# Patient Record
Sex: FEMALE
[De-identification: names, ages, dates, MRNs, and addresses within clinical notes are randomized per-mention and may not be internally consistent; named-entity substitution may affect disease eponyms.]

---

## 2019-05-02 PROBLEM — Z00.00 ENCOUNTER FOR PREVENTIVE HEALTH EXAMINATION: Status: ACTIVE | Noted: 2019-05-02

## 2019-05-07 ENCOUNTER — APPOINTMENT (OUTPATIENT)
Dept: SURGERY | Facility: CLINIC | Age: 44
End: 2019-05-07

## 2019-07-11 ENCOUNTER — APPOINTMENT (OUTPATIENT)
Dept: SURGERY | Facility: CLINIC | Age: 44
End: 2019-07-11
Payer: COMMERCIAL

## 2019-07-11 VITALS
DIASTOLIC BLOOD PRESSURE: 68 MMHG | SYSTOLIC BLOOD PRESSURE: 104 MMHG | HEIGHT: 68 IN | TEMPERATURE: 98.3 F | WEIGHT: 135 LBS | OXYGEN SATURATION: 99 % | HEART RATE: 79 BPM | BODY MASS INDEX: 20.46 KG/M2

## 2019-07-11 DIAGNOSIS — Z78.9 OTHER SPECIFIED HEALTH STATUS: ICD-10-CM

## 2019-07-11 PROCEDURE — 99203 OFFICE O/P NEW LOW 30 MIN: CPT

## 2019-07-11 RX ORDER — FOLLITROPIN BETA,RECOMB 75 UNIT
VIAL (EA) INJECTION
Refills: 0 | Status: ACTIVE | COMMUNITY

## 2019-07-11 RX ORDER — MENOTROPINS 75 UNIT
KIT SUBCUTANEOUS
Refills: 0 | Status: ACTIVE | COMMUNITY

## 2019-07-11 NOTE — PHYSICAL EXAM
[Normal Rate and Rhythm] : normal rate and rhythm [Normal Breath Sounds] : Normal breath sounds [Alert] : alert [Oriented to Person] : oriented to person [Oriented to Place] : oriented to place [Oriented to Time] : oriented to time [Calm] : calm [JVD] : no jugular venous distention  [Abdominal Masses] : No abdominal masses [Abdomen Tenderness] : ~T ~M No abdominal tenderness [de-identified] : A/Ox3; NAD. appears irritable/anxious  [de-identified] : no LE edema, no erythema  [de-identified] : EOMI [de-identified] : has a small cyst, presently no infection noted, cyst <0.5 cm

## 2019-07-11 NOTE — PLAN
[FreeTextEntry1] : presently , no infection noted.\par Pt was very argumentative \par she does not want to have the cyst excised at this time.\par Recommended excision\par patient will return if she decides to have it removed.

## 2019-07-11 NOTE — HISTORY OF PRESENT ILLNESS
[de-identified] : 43 y.o F referred for consultation visit, she has a cyst on the back. Patient reports the cyst measured 3 cm in the past, and was infected. The cyst was \par drained. Presently, she does not have an infection.

## 2019-07-11 NOTE — REVIEW OF SYSTEMS
[Chills] : no chills [Fever] : no fever [Eyesight Problems] : no eyesight problems [Nosebleeds] : no nosebleeds [Chest Pain] : no chest pain [Shortness Of Breath] : no shortness of breath [Wheezing] : no wheezing [Cough] : no cough [Abdominal Pain] : no abdominal pain [Arthralgias] : no arthralgias [Dizziness] : no dizziness [Anxiety] : no anxiety [Muscle Weakness] : no muscle weakness [Swollen Glands] : no swollen glands [de-identified] : complains of having a cyst, upper back

## 2025-02-09 ENCOUNTER — NON-APPOINTMENT (OUTPATIENT)
Age: 50
End: 2025-02-09

## 2025-06-10 ENCOUNTER — NON-APPOINTMENT (OUTPATIENT)
Age: 50
End: 2025-06-10

## 2025-07-13 ENCOUNTER — NON-APPOINTMENT (OUTPATIENT)
Age: 50
End: 2025-07-13

## 2025-07-13 ENCOUNTER — INPATIENT (INPATIENT)
Facility: HOSPITAL | Age: 50
LOS: 2 days | Discharge: HOME | DRG: 694 | End: 2025-07-16
Attending: STUDENT IN AN ORGANIZED HEALTH CARE EDUCATION/TRAINING PROGRAM | Admitting: STUDENT IN AN ORGANIZED HEALTH CARE EDUCATION/TRAINING PROGRAM
Payer: COMMERCIAL

## 2025-07-13 VITALS
HEIGHT: 68 IN | HEART RATE: 55 BPM | DIASTOLIC BLOOD PRESSURE: 83 MMHG | WEIGHT: 151.02 LBS | RESPIRATION RATE: 16 BRPM | SYSTOLIC BLOOD PRESSURE: 125 MMHG | TEMPERATURE: 99 F | OXYGEN SATURATION: 99 %

## 2025-07-13 DIAGNOSIS — N23 UNSPECIFIED RENAL COLIC: ICD-10-CM

## 2025-07-13 LAB
ALBUMIN SERPL ELPH-MCNC: 4.4 G/DL — SIGNIFICANT CHANGE UP (ref 3.5–5)
ALP SERPL-CCNC: 73 U/L — SIGNIFICANT CHANGE UP (ref 40–120)
ALT FLD-CCNC: 27 U/L DA — SIGNIFICANT CHANGE UP (ref 10–60)
ANION GAP SERPL CALC-SCNC: 8 MMOL/L — SIGNIFICANT CHANGE UP (ref 5–17)
APPEARANCE UR: ABNORMAL
AST SERPL-CCNC: 20 U/L — SIGNIFICANT CHANGE UP (ref 10–40)
BACTERIA # UR AUTO: ABNORMAL /HPF
BASOPHILS # BLD AUTO: 0.01 K/UL — SIGNIFICANT CHANGE UP (ref 0–0.2)
BASOPHILS NFR BLD AUTO: 0.1 % — SIGNIFICANT CHANGE UP (ref 0–2)
BILIRUB SERPL-MCNC: 0.7 MG/DL — SIGNIFICANT CHANGE UP (ref 0.2–1.2)
BILIRUB UR-MCNC: ABNORMAL
BUN SERPL-MCNC: 15 MG/DL — SIGNIFICANT CHANGE UP (ref 7–18)
CALCIUM SERPL-MCNC: 9.7 MG/DL — SIGNIFICANT CHANGE UP (ref 8.4–10.5)
CHLORIDE SERPL-SCNC: 109 MMOL/L — HIGH (ref 96–108)
CO2 SERPL-SCNC: 23 MMOL/L — SIGNIFICANT CHANGE UP (ref 22–31)
COLOR SPEC: ABNORMAL
CREAT SERPL-MCNC: 1.02 MG/DL — SIGNIFICANT CHANGE UP (ref 0.5–1.3)
DIFF PNL FLD: ABNORMAL
EGFR: 67 ML/MIN/1.73M2 — SIGNIFICANT CHANGE UP
EGFR: 67 ML/MIN/1.73M2 — SIGNIFICANT CHANGE UP
EOSINOPHIL # BLD AUTO: 0 K/UL — SIGNIFICANT CHANGE UP (ref 0–0.5)
EOSINOPHIL NFR BLD AUTO: 0 % — SIGNIFICANT CHANGE UP (ref 0–6)
EPI CELLS # UR: PRESENT
GLUCOSE SERPL-MCNC: 133 MG/DL — HIGH (ref 70–99)
GLUCOSE UR QL: NEGATIVE MG/DL — SIGNIFICANT CHANGE UP
HCG SERPL-ACNC: <1 MIU/ML — SIGNIFICANT CHANGE UP
HCT VFR BLD CALC: 37.1 % — SIGNIFICANT CHANGE UP (ref 34.5–45)
HGB BLD-MCNC: 12.6 G/DL — SIGNIFICANT CHANGE UP (ref 11.5–15.5)
IMM GRANULOCYTES NFR BLD AUTO: 0.3 % — SIGNIFICANT CHANGE UP (ref 0–0.9)
KETONES UR QL: >=160 MG/DL
LACTATE SERPL-SCNC: 1.4 MMOL/L — SIGNIFICANT CHANGE UP (ref 0.7–2)
LEUKOCYTE ESTERASE UR-ACNC: ABNORMAL
LIDOCAIN IGE QN: 16 U/L — SIGNIFICANT CHANGE UP (ref 13–75)
LYMPHOCYTES # BLD AUTO: 0.61 K/UL — LOW (ref 1–3.3)
LYMPHOCYTES # BLD AUTO: 6 % — LOW (ref 13–44)
MCHC RBC-ENTMCNC: 31.8 PG — SIGNIFICANT CHANGE UP (ref 27–34)
MCHC RBC-ENTMCNC: 34 G/DL — SIGNIFICANT CHANGE UP (ref 32–36)
MCV RBC AUTO: 93.7 FL — SIGNIFICANT CHANGE UP (ref 80–100)
MONOCYTES # BLD AUTO: 0.24 K/UL — SIGNIFICANT CHANGE UP (ref 0–0.9)
MONOCYTES NFR BLD AUTO: 2.3 % — SIGNIFICANT CHANGE UP (ref 2–14)
NEUTROPHILS # BLD AUTO: 9.34 K/UL — HIGH (ref 1.8–7.4)
NEUTROPHILS NFR BLD AUTO: 91.3 % — HIGH (ref 43–77)
NITRITE UR-MCNC: NEGATIVE — SIGNIFICANT CHANGE UP
NRBC BLD AUTO-RTO: 0 /100 WBCS — SIGNIFICANT CHANGE UP (ref 0–0)
PH UR: 6.5 — SIGNIFICANT CHANGE UP (ref 5–8)
PLATELET # BLD AUTO: 219 K/UL — SIGNIFICANT CHANGE UP (ref 150–400)
POTASSIUM SERPL-MCNC: 3.9 MMOL/L — SIGNIFICANT CHANGE UP (ref 3.5–5.3)
POTASSIUM SERPL-SCNC: 3.9 MMOL/L — SIGNIFICANT CHANGE UP (ref 3.5–5.3)
PROT SERPL-MCNC: 7.9 G/DL — SIGNIFICANT CHANGE UP (ref 6–8.3)
PROT UR-MCNC: 100 MG/DL
RBC # BLD: 3.96 M/UL — SIGNIFICANT CHANGE UP (ref 3.8–5.2)
RBC # FLD: 12.3 % — SIGNIFICANT CHANGE UP (ref 10.3–14.5)
RBC CASTS # UR COMP ASSIST: ABNORMAL /HPF
SODIUM SERPL-SCNC: 140 MMOL/L — SIGNIFICANT CHANGE UP (ref 135–145)
SP GR SPEC: 1.03 — SIGNIFICANT CHANGE UP (ref 1–1.03)
UROBILINOGEN FLD QL: 1 MG/DL — SIGNIFICANT CHANGE UP (ref 0.2–1)
WBC # BLD: 10.23 K/UL — SIGNIFICANT CHANGE UP (ref 3.8–10.5)
WBC # FLD AUTO: 10.23 K/UL — SIGNIFICANT CHANGE UP (ref 3.8–10.5)
WBC UR QL: 5 /HPF — SIGNIFICANT CHANGE UP (ref 0–5)

## 2025-07-13 PROCEDURE — 81001 URINALYSIS AUTO W/SCOPE: CPT

## 2025-07-13 PROCEDURE — 85025 COMPLETE CBC W/AUTO DIFF WBC: CPT

## 2025-07-13 PROCEDURE — 83690 ASSAY OF LIPASE: CPT

## 2025-07-13 PROCEDURE — 74176 CT ABD & PELVIS W/O CONTRAST: CPT

## 2025-07-13 PROCEDURE — 80053 COMPREHEN METABOLIC PANEL: CPT

## 2025-07-13 PROCEDURE — 84702 CHORIONIC GONADOTROPIN TEST: CPT

## 2025-07-13 PROCEDURE — 74176 CT ABD & PELVIS W/O CONTRAST: CPT | Mod: 26

## 2025-07-13 PROCEDURE — 99285 EMERGENCY DEPT VISIT HI MDM: CPT

## 2025-07-13 PROCEDURE — 36415 COLL VENOUS BLD VENIPUNCTURE: CPT

## 2025-07-13 PROCEDURE — 99223 1ST HOSP IP/OBS HIGH 75: CPT | Mod: GC

## 2025-07-13 PROCEDURE — 83605 ASSAY OF LACTIC ACID: CPT

## 2025-07-13 RX ORDER — KETOROLAC TROMETHAMINE 30 MG/ML
15 INJECTION, SOLUTION INTRAMUSCULAR; INTRAVENOUS ONCE
Refills: 0 | Status: DISCONTINUED | OUTPATIENT
Start: 2025-07-13 | End: 2025-07-13

## 2025-07-13 RX ORDER — ONDANSETRON HCL/PF 4 MG/2 ML
4 VIAL (ML) INJECTION ONCE
Refills: 0 | Status: COMPLETED | OUTPATIENT
Start: 2025-07-13 | End: 2025-07-13

## 2025-07-13 RX ORDER — ACETAMINOPHEN 500 MG/5ML
650 LIQUID (ML) ORAL ONCE
Refills: 0 | Status: COMPLETED | OUTPATIENT
Start: 2025-07-13 | End: 2025-07-13

## 2025-07-13 RX ORDER — TAMSULOSIN HYDROCHLORIDE 0.4 MG/1
0.4 CAPSULE ORAL AT BEDTIME
Refills: 0 | Status: DISCONTINUED | OUTPATIENT
Start: 2025-07-13 | End: 2025-07-16

## 2025-07-13 RX ADMIN — Medication 650 MILLIGRAM(S): at 19:46

## 2025-07-13 RX ADMIN — Medication 4 MILLIGRAM(S): at 19:45

## 2025-07-13 RX ADMIN — Medication 2 MILLIGRAM(S): at 19:45

## 2025-07-13 RX ADMIN — KETOROLAC TROMETHAMINE 15 MILLIGRAM(S): 30 INJECTION, SOLUTION INTRAMUSCULAR; INTRAVENOUS at 20:16

## 2025-07-13 RX ADMIN — Medication 4 MILLIGRAM(S): at 20:58

## 2025-07-13 RX ADMIN — Medication 4 MILLIGRAM(S): at 20:59

## 2025-07-13 RX ADMIN — Medication 2 MILLIGRAM(S): at 20:15

## 2025-07-13 RX ADMIN — Medication 4 MILLIGRAM(S): at 21:28

## 2025-07-13 RX ADMIN — Medication 650 MILLIGRAM(S): at 20:16

## 2025-07-13 RX ADMIN — KETOROLAC TROMETHAMINE 15 MILLIGRAM(S): 30 INJECTION, SOLUTION INTRAMUSCULAR; INTRAVENOUS at 19:46

## 2025-07-13 RX ADMIN — Medication 1000 MILLILITER(S): at 20:45

## 2025-07-13 RX ADMIN — Medication 20 MILLIGRAM(S): at 19:45

## 2025-07-13 RX ADMIN — Medication 1000 MILLILITER(S): at 19:45

## 2025-07-13 NOTE — H&P ADULT - HISTORY OF PRESENT ILLNESS
50 y/o F w/ no significant PMHx presenting with left lower quadrant abdominal associated with hematuria, nausea and NBNB vomiting starting today morning around 11am. Pt initially went to urgent care for symptoms, however was recommended to go to the ED for concerning symptoms and further evaluation. Pt describes the pain as dull, rating it 7/10 along with chills and inability to keep anything down including water without vomiting. Pt denies any history of similar symptoms or hx of kidney stones. She attempted to take OTC Tylenol at home however unable to keep it down. Of note, pt drinks only about 2-3 glasses of water a day. Pt further denies fever, hx abdominal surgeries, hematochezia, dizziness, dysuria/urinary frequency, hememesis, diarrhea/constipation    In the ED, treated with IVF, antiemetics, pain control: received IVF 1000 bolus, zofran, acetaminophen, pepcid, ketorolac, and morphine one time dose with moderate temporary relief of pain, but still nauseous and vomiting, now intermittent dull abdominal pain. On Ceftriaxone 1000 mg to cover for possible pyelonephritis.     Labs to r/o electrolyte abnml and pancreatitis- no leukocytosis, no DONTRELL noted, lipase wnl, HCG NEG  Lactate- WNL 1.4  UA to r/o UTI- NEG for UTI, but large amount blood   CT renal to r/o nephrolithiasis/colitis: shows a left-sided 4 mm stone 50 y/o F w/ no significant PMHx presenting with left lower quadrant abdominal associated with hematuria, nausea and NBNB vomiting starting today morning around 11am. Pt initially went to urgent care for symptoms, however was recommended to go to the ED for concerning symptoms and further evaluation. Pt describes the pain as dull, initially rating it 7/10 along with chills and inability to keep anything down including water without vomiting. Now, pain is about 3/10 and able to intake few sips of water. Pt denies any history of similar symptoms or hx of kidney stones. She attempted to take OTC Tylenol at home however unable to keep it down. Of note, pt drinks only about 2-3 glasses of water a day. Pt further denies fever, hx abdominal surgeries, hematochezia, dizziness, dysuria/urinary frequency, hememesis, diarrhea/constipation    In the ED, treated with IVF, antiemetics, pain control: received IVF 1000 bolus, zofran, acetaminophen, pepcid, ketorolac, and morphine one time dose with moderate temporary relief of pain, but still nauseous and vomiting, now intermittent dull abdominal pain. On Ceftriaxone 1000 mg to cover for possible pyelonephritis.     Labs to r/o electrolyte abnml and pancreatitis- no leukocytosis, no DONTRELL noted, lipase wnl, HCG NEG  Lactate- WNL 1.4  UA to r/o UTI- NEG for UTI, but large amount blood   CT renal to r/o nephrolithiasis/colitis: shows a left-sided 4 mm stone 48 y/o F w/ no significant PMHx presenting with left lower quadrant abdominal pain associated with hematuria, nausea and NBNB vomiting starting today morning around 11am. Pt initially went to urgent care for symptoms, however was recommended to go to the ED for concerning symptoms and further evaluation. Pt describes the pain as dull, initially rating it 7/10 along with chills and inability to keep anything down including water without vomiting. Now, pain is about 3/10 and able to intake only few sips of water. Pt denies any history of similar symptoms or hx of kidney stones. She attempted to take OTC Tylenol at home however unable to keep it down. Of note, pt drinks only about 2-3 glasses of water a day. Pt further denies fever, hx abdominal surgeries, hematochezia, dizziness, dysuria/urinary frequency, hematemesis, diarrhea/constipation.   Last BM: yesterday and a small BM sunday morning    In the ED, treated with IVF, antiemetics, pain control: received IVF 1000 bolus, zofran, acetaminophen, pepcid, ketorolac, and morphine one time dose with moderate temporary relief of pain, but still nauseous and vomiting, now with intermittent dull abdominal pain. On Ceftriaxone 1000 mg to cover for possible pyelonephritis.     Labs to r/o electrolyte abnml and pancreatitis- no leukocytosis, no DONTRELL noted, lipase wnl, HCG NEG  Lactate- WNL 1.4  UA to r/o UTI- NEG for UTI, but large amount blood   CT renal to r/o nephrolithiasis/colitis: shows a left-sided 4 mm stone

## 2025-07-13 NOTE — H&P ADULT - NSHPPHYSICALEXAM_GEN_ALL_CORE
T(C): 36.7 (07-13-25 @ 23:57), Max: 37.1 (07-13-25 @ 18:34)  HR: 57 (07-13-25 @ 23:57) (55 - 57)  BP: 146/91 (07-13-25 @ 23:57) (125/83 - 146/91)  RR: 18 (07-13-25 @ 23:57) (16 - 18)  SpO2: 96% (07-13-25 @ 23:57) (96% - 99%)    CONSTITUTIONAL: Well groomed, no apparent distress, dressed in coat and knitted beanie  EYES: PERRLA and symmetric, EOMI, No conjunctival or scleral injection, non-icteric  ENMT: Mildly dry oral mucous membranes. Normal dentition; no pharyngeal injection or exudates  NECK: Supple, symmetric and without tracheal deviation   RESP: No respiratory distress, no use of accessory muscles; CTA b/l, no WRR  CV: RRR, +S1S2, no MRG; no JVD; no peripheral edema  GI: Soft, mild tenderness to palpation in the LLQ, no rebound, no guarding; no palpable masses; no hepatosplenomegaly; no CVA tenderness   LYMPH: No cervical LAD or tenderness; no axillary LAD or tenderness; no inguinal LAD or tenderness  MSK: Normal gait; No digital clubbing or cyanosis, Normal ROM without pain, normal muscle strength/tone  SKIN: No rashes or ulcers noted; no subcutaneous nodules or induration palpable  NEURO: CN II-XII intact; normal reflexes in upper and lower extremities, sensation intact in upper and lower extremities b/l to light touch

## 2025-07-13 NOTE — H&P ADULT - PROBLEM SELECTOR PLAN 2
- Zofran, pepcid for symptom control NBNB vomiting and nausea, unable to tolerate PO intake except for few sips of water.     - C/w Zofran, pepcid for symptom control  - IVF

## 2025-07-13 NOTE — ED ADULT NURSE NOTE - NSFALLUNIVINTERV_ED_ALL_ED
Bed/Stretcher in lowest position, wheels locked, appropriate side rails in place/Call bell, personal items and telephone in reach/Instruct patient to call for assistance before getting out of bed/chair/stretcher/Non-slip footwear applied when patient is off stretcher/[place] to call system/Physically safe environment - no spills, clutter or unnecessary equipment/Purposeful proactive rounding/Room/bathroom lighting operational, light cord in reach

## 2025-07-13 NOTE — H&P ADULT - ATTENDING COMMENTS
IMAGING  Non-Contrast CT A/P  IMPRESSION:  Mild left perinephric stranding and mild left hydronephrosis and hydroureter secondary to a 4 mm stone at the proximal left ureter.    HPI  49 year old female patient with no pmhx who presented to the ER due to left flank pain with nausea, vomiting, and hematuria.  The patient states her symptoms started at 11am today. She has been unable to tolerate oral intake as she vomits up anything she tries to eat including water. She went to Urgent Care where she was given Zofran sublingually. CT A/P with left 4mm kidney stone. Patient denies personal or family history of kidney stones. No fever, no chills, no dysuria.    Review Of Systems included: + nausea, + vomiting, + loss of appetite, + left sided abdominal/flank pain, + hematuria,   No fever, no chills, no dysuria, no diarrhea, no prior kidney stones, no family history of kidney stones    Patient preferred to speak in English when  offered  Physical Exam  General: Awake, Alert, Oriented  Cardiac: RRR  Pulmonary: CTA b/l  Abdominal: Soft, ND, +LLQ tenderness, no CVA tenderness b/l  Extremities: No edema b/l    A/P  # 4 mm Left Kidney Stone  # Nausea and Vomiting  # Severe Intractable Pain  > U/A negative for UTI  - Urology consulted  - IV Fluid Hydration  - Flomax 0.4mg qHS  - Tylenol prn for mild pain, IV Morphine 1mg prn for moderate pain, IV Morphine 2mg prn for severe pain  - Zofran prn  - Will hold off on antibiotics as no fever, no leukocytosis, and U/A is negative  - Strain urine for calculi    # DVT PPx  - Lovenox  IMPROVE Score: 0 pts    # FEN  - Regular Diet  - Monitor and replete electrolytes as needed  - IV Fluid Hydration    Previous Admissions Included  7/13/2025: Urgent Care Visit for hematuria, lower abdominal pain  6/10/2025: Urgent Care Visit for URI  2/9/2025: Urgent Care Visit for Influenza, Sore Throat  7/11/2019: Surgery Clinic Visit: cyst on back. presently, no infection noted. she does not want to have the cyst excised at this time.    Patient case and management was discussed with ER Attending  I did examine all labs (including CBC, CMP, Lactate, Lipase, HCG, UA), imaging, prior notes    Time-based billing (NON-critical care).  76 minutes spent on total encounter excluding any time spent teaching residents. The necessity of the time spent during the encounter on this date of service was due to: Review of records, vital signs, labs, microbiology, and imaging, Ordering labs and/or tests, General physical examination performed, Generation of treatment plan, Counseling of the patient and/or family members

## 2025-07-13 NOTE — H&P ADULT - NSHPREVIEWOFSYSTEMS_GEN_ALL_CORE
REVIEW OF SYSTEMS:    CONSTITUTIONAL: No weakness, fevers (+) chills  EYES/ENT: No visual changes;  No vertigo or throat pain   NECK: No pain or stiffness  RESPIRATORY: No cough, wheezing, hemoptysis; No shortness of breath  CARDIOVASCULAR: No chest pain or palpitations  GASTROINTESTINAL: (+) abdominal pain. (+) nausea, vomiting, or NO hematemesis; No diarrhea or constipation. No melena or hematochezia.  GENITOURINARY: No dysuria, frequency (+) hematuria  NEUROLOGICAL: No numbness or weakness  SKIN: No itching, burning, rashes, or lesions REVIEW OF SYSTEMS:    CONSTITUTIONAL: No weakness, fevers (+) chills  EYES/ENT: No visual changes;  No vertigo or throat pain   NECK: No pain or stiffness  RESPIRATORY: No cough, wheezing, hemoptysis; No shortness of breath  CARDIOVASCULAR: No chest pain or palpitations  GASTROINTESTINAL: (+) abdominal pain. (+) nausea, vomiting,  NO hematemesis; No diarrhea or constipation. No melena or hematochezia.  GENITOURINARY: No dysuria, frequency (+) hematuria  NEUROLOGICAL: No numbness or weakness  SKIN: No itching, burning, rashes, or lesions

## 2025-07-13 NOTE — ED PROVIDER NOTE - PHYSICAL EXAMINATION
Gen: no acute distress. uncomfortable appearing   Head: normocephalic, atraumatic  Lung: CTAB, no respiratory distress, no wheezing, rales, rhonchi  CV: normal s1/s2, rrr,   Abd: soft,  mildly tender to palpation in the left lower quadrant, left CVA tenderness, no rebound no guarding no peritoneal signs  MSK: full range of motion in all 4 extremities  Neuro: No focal neurologic deficits

## 2025-07-13 NOTE — ED ADULT NURSE NOTE - ED STAT RN HANDOFF DETAILS
Patient transferred to main ED and was advised for admission. Report given to rosalie hines for continuity of plan of care.

## 2025-07-13 NOTE — ED PROVIDER NOTE - OBJECTIVE STATEMENT
Patient is a 49-year-old female with no significant past medical history presenting  with left flank pain radiating to the groin as well as hematuria since earlier this morning.  As per patient and sister at bedside who is translating for patient as per patient request, patient has had dull intermittent left flank pain radiating to the left groin since yesterday as well as hematuria.  Denies any past medical history, allergies.  Denies any daily alcohol use, smoking, IV drug use.  Denies any fever, cough, congestion, dysuria, diarrhea.  Patient endorsing some nausea.

## 2025-07-13 NOTE — ED PROVIDER NOTE - ATTENDING APP SHARED VISIT CONTRIBUTION OF CARE
I have personally performed a history and physical exam on this patient and personally directed the management of the patient.    Patient is a 49-year-old female with no significant past medical history presenting  with left flank pain radiating to the groin as well as hematuria since earlier this morning.  As per patient and sister at bedside who is translating for patient as per patient request, patient has had dull intermittent left flank pain radiating to the left groin since yesterday as well as hematuria.  Denies any past medical history, allergies.  Denies any daily alcohol use, smoking, IV drug use.  Denies any fever, cough, congestion, dysuria, diarrhea.  Patient endorsing some nausea.

## 2025-07-13 NOTE — H&P ADULT - ASSESSMENT
50 y/o F w/ no significant PMHx presenting with left lower quadrant/flank pain associated with hematuria, nausea and vomiting. Found to have 4mm kidney stone on CT. Admitted to medicine for intractable nausea, vomiting and intermittent fla    oxycodone 5mg PO ?? pain is not that bad.  50 y/o F w/ no significant PMHx presenting with left lower quadrant/flank pain associated with hematuria, nausea and vomiting. Found to have 4mm kidney stone on CT; large amount of blood in UA, labs WNL, no leukocytosis. Admitted to medicine for intractable nausea, vomiting and inability to tolerate much PO intake other than few sips of water at a time.     50 y/o F w/ no significant PMHx presenting with left lower quadrant/flank pain associated with hematuria, nausea and vomiting. Found to have 4mm kidney stone on CT; large amount of blood in UA, labs WNL, no leukocytosis. Admitted to medicine for intractable nausea, vomiting and inability to tolerate PO intake other than few sips of water at a time. Started on Tamsulosin, IVF, zofran, pepcid and pain control with morphine and Tylenol.

## 2025-07-13 NOTE — ED PROVIDER NOTE - CLINICAL SUMMARY MEDICAL DECISION MAKING FREE TEXT BOX
Patient is a 49-year-old female with no significant past medical history presenting  with left flank pain radiating to the groin as well as hematuria since earlier this morning.  As per patient and sister at bedside who is translating for patient as per patient request, patient has had dull intermittent left flank pain radiating to the left groin since yesterday as well as hematuria.  VSS, uncomfortable appearing, belly soft,  mildly tender to palpation in the left lower quadrant, left CVA tenderness, no rebound no guarding no peritoneal signs.  -  will treat pain, antiemetics, check labs rule out electrolyte abnormalities and pancreatitis, lactate to eval for end organ dysfunction, UA to rule out urinary tract infection, CT renal stone to rule out nephrolithiasis versus colitis, reassess. Patient is a 49-year-old female with no significant past medical history presenting  with left flank pain radiating to the groin as well as hematuria since earlier this morning.  As per patient and sister at bedside who is translating for patient as per patient request, patient has had dull intermittent left flank pain radiating to the left groin since yesterday as well as hematuria.  VSS, uncomfortable appearing, belly soft,  mildly tender to palpation in the left lower quadrant, left CVA tenderness, no rebound no guarding no peritoneal signs.  -  will treat pain, antiemetics, check labs rule out electrolyte abnormalities and pancreatitis, lactate to eval for end organ dysfunction, UA to rule out urinary tract infection, CT renal stone to rule out nephrolithiasis versus colitis, reassess.    22: 56–signout from Dr. Jung pending CT results.  CT shows a left-sided 4 mm stone.  no leukocytosis.  No signs of urinary infection.  No DONTRELL.  However despite all the medication patient still vomiting and pain uncontrolled.  Discussed with urology for consult.  Plan to admit to medicine for symptomatic relief.  Discussed with Dr. Chamberlain.

## 2025-07-13 NOTE — H&P ADULT - PROBLEM SELECTOR PLAN 1
Found to have 4mm kidney stone on CT on the LT proximal ureter    -C/w Tamsulosin   - Pain control with morphine and Acetominophen - LLQ abdominal pain with hematuria. Found to have 4mm kidney stone on CT on the LT proximal ureter with large amounts of blood in UA     -C/w Tamsulosin   - C/w IVF  - Pain control with morphine for severe and moderate pain and Acetominophen  - D/c Ceftriaxone no evidence of pyelonephritis/superimposed UTI- no leukocytosis, or bacteria/WBC in urine

## 2025-07-13 NOTE — ED ADULT NURSE NOTE - OBJECTIVE STATEMENT
Patient presented to ED with left flank pain and hematuria started today. Denies any fever or dysuria.

## 2025-07-14 ENCOUNTER — TRANSCRIPTION ENCOUNTER (OUTPATIENT)
Age: 50
End: 2025-07-14

## 2025-07-14 DIAGNOSIS — Z29.9 ENCOUNTER FOR PROPHYLACTIC MEASURES, UNSPECIFIED: ICD-10-CM

## 2025-07-14 DIAGNOSIS — R11.2 NAUSEA WITH VOMITING, UNSPECIFIED: ICD-10-CM

## 2025-07-14 DIAGNOSIS — N20.0 CALCULUS OF KIDNEY: ICD-10-CM

## 2025-07-14 LAB
ANION GAP SERPL CALC-SCNC: 7 MMOL/L — SIGNIFICANT CHANGE UP (ref 5–17)
BUN SERPL-MCNC: 17 MG/DL — SIGNIFICANT CHANGE UP (ref 7–18)
CALCIUM SERPL-MCNC: 9 MG/DL — SIGNIFICANT CHANGE UP (ref 8.4–10.5)
CHLORIDE SERPL-SCNC: 108 MMOL/L — SIGNIFICANT CHANGE UP (ref 96–108)
CO2 SERPL-SCNC: 24 MMOL/L — SIGNIFICANT CHANGE UP (ref 22–31)
CREAT SERPL-MCNC: 0.92 MG/DL — SIGNIFICANT CHANGE UP (ref 0.5–1.3)
EGFR: 76 ML/MIN/1.73M2 — SIGNIFICANT CHANGE UP
EGFR: 76 ML/MIN/1.73M2 — SIGNIFICANT CHANGE UP
GLUCOSE SERPL-MCNC: 123 MG/DL — HIGH (ref 70–99)
HCT VFR BLD CALC: 34.4 % — LOW (ref 34.5–45)
HGB BLD-MCNC: 11.6 G/DL — SIGNIFICANT CHANGE UP (ref 11.5–15.5)
MAGNESIUM SERPL-MCNC: 2.2 MG/DL — SIGNIFICANT CHANGE UP (ref 1.6–2.6)
MCHC RBC-ENTMCNC: 32 PG — SIGNIFICANT CHANGE UP (ref 27–34)
MCHC RBC-ENTMCNC: 33.7 G/DL — SIGNIFICANT CHANGE UP (ref 32–36)
MCV RBC AUTO: 95 FL — SIGNIFICANT CHANGE UP (ref 80–100)
NRBC BLD AUTO-RTO: 0 /100 WBCS — SIGNIFICANT CHANGE UP (ref 0–0)
PHOSPHATE SERPL-MCNC: 3.8 MG/DL — SIGNIFICANT CHANGE UP (ref 2.5–4.5)
PLATELET # BLD AUTO: 178 K/UL — SIGNIFICANT CHANGE UP (ref 150–400)
POTASSIUM SERPL-MCNC: 3.7 MMOL/L — SIGNIFICANT CHANGE UP (ref 3.5–5.3)
POTASSIUM SERPL-SCNC: 3.7 MMOL/L — SIGNIFICANT CHANGE UP (ref 3.5–5.3)
RBC # BLD: 3.62 M/UL — LOW (ref 3.8–5.2)
RBC # FLD: 12.5 % — SIGNIFICANT CHANGE UP (ref 10.3–14.5)
SODIUM SERPL-SCNC: 139 MMOL/L — SIGNIFICANT CHANGE UP (ref 135–145)
WBC # BLD: 10.34 K/UL — SIGNIFICANT CHANGE UP (ref 3.8–10.5)
WBC # FLD AUTO: 10.34 K/UL — SIGNIFICANT CHANGE UP (ref 3.8–10.5)

## 2025-07-14 PROCEDURE — 85027 COMPLETE CBC AUTOMATED: CPT

## 2025-07-14 PROCEDURE — 84702 CHORIONIC GONADOTROPIN TEST: CPT

## 2025-07-14 PROCEDURE — 80048 BASIC METABOLIC PNL TOTAL CA: CPT

## 2025-07-14 PROCEDURE — 87640 STAPH A DNA AMP PROBE: CPT

## 2025-07-14 PROCEDURE — 80053 COMPREHEN METABOLIC PANEL: CPT

## 2025-07-14 PROCEDURE — 83690 ASSAY OF LIPASE: CPT

## 2025-07-14 PROCEDURE — 74176 CT ABD & PELVIS W/O CONTRAST: CPT

## 2025-07-14 PROCEDURE — 36415 COLL VENOUS BLD VENIPUNCTURE: CPT

## 2025-07-14 PROCEDURE — 84100 ASSAY OF PHOSPHORUS: CPT

## 2025-07-14 PROCEDURE — 83605 ASSAY OF LACTIC ACID: CPT

## 2025-07-14 PROCEDURE — 81001 URINALYSIS AUTO W/SCOPE: CPT

## 2025-07-14 PROCEDURE — 99232 SBSQ HOSP IP/OBS MODERATE 35: CPT

## 2025-07-14 PROCEDURE — 99223 1ST HOSP IP/OBS HIGH 75: CPT

## 2025-07-14 PROCEDURE — 87086 URINE CULTURE/COLONY COUNT: CPT

## 2025-07-14 PROCEDURE — 85025 COMPLETE CBC W/AUTO DIFF WBC: CPT

## 2025-07-14 PROCEDURE — 87641 MR-STAPH DNA AMP PROBE: CPT

## 2025-07-14 PROCEDURE — 83735 ASSAY OF MAGNESIUM: CPT

## 2025-07-14 RX ORDER — OXYCODONE HYDROCHLORIDE 30 MG/1
1 TABLET ORAL
Qty: 12 | Refills: 0
Start: 2025-07-14 | End: 2025-07-16

## 2025-07-14 RX ORDER — TAMSULOSIN HYDROCHLORIDE 0.4 MG/1
1 CAPSULE ORAL
Qty: 30 | Refills: 0
Start: 2025-07-14 | End: 2025-08-12

## 2025-07-14 RX ORDER — SODIUM CHLORIDE 9 G/1000ML
1000 INJECTION, SOLUTION INTRAVENOUS
Refills: 0 | Status: DISCONTINUED | OUTPATIENT
Start: 2025-07-14 | End: 2025-07-15

## 2025-07-14 RX ORDER — ACETAMINOPHEN 500 MG/5ML
975 LIQUID (ML) ORAL EVERY 8 HOURS
Refills: 0 | Status: DISCONTINUED | OUTPATIENT
Start: 2025-07-14 | End: 2025-07-14

## 2025-07-14 RX ORDER — IBUPROFEN 200 MG
600 TABLET ORAL EVERY 8 HOURS
Refills: 0 | Status: DISCONTINUED | OUTPATIENT
Start: 2025-07-14 | End: 2025-07-15

## 2025-07-14 RX ORDER — CEFTRIAXONE 500 MG/1
1000 INJECTION, POWDER, FOR SOLUTION INTRAMUSCULAR; INTRAVENOUS EVERY 24 HOURS
Refills: 0 | Status: DISCONTINUED | OUTPATIENT
Start: 2025-07-13 | End: 2025-07-14

## 2025-07-14 RX ORDER — ACETAMINOPHEN 500 MG/5ML
2 LIQUID (ML) ORAL
Qty: 50 | Refills: 0
Start: 2025-07-14 | End: 2025-08-12

## 2025-07-14 RX ORDER — OXYCODONE HYDROCHLORIDE 30 MG/1
2.5 TABLET ORAL EVERY 4 HOURS
Refills: 0 | Status: DISCONTINUED | OUTPATIENT
Start: 2025-07-14 | End: 2025-07-15

## 2025-07-14 RX ORDER — OXYCODONE HYDROCHLORIDE AND ACETAMINOPHEN 10; 325 MG/1; MG/1
1 TABLET ORAL EVERY 4 HOURS
Refills: 0 | Status: DISCONTINUED | OUTPATIENT
Start: 2025-07-14 | End: 2025-07-14

## 2025-07-14 RX ORDER — SODIUM CHLORIDE 9 G/1000ML
1000 INJECTION, SOLUTION INTRAVENOUS
Refills: 0 | Status: DISCONTINUED | OUTPATIENT
Start: 2025-07-13 | End: 2025-07-14

## 2025-07-14 RX ORDER — ENOXAPARIN SODIUM 100 MG/ML
40 INJECTION SUBCUTANEOUS EVERY 24 HOURS
Refills: 0 | Status: DISCONTINUED | OUTPATIENT
Start: 2025-07-14 | End: 2025-07-14

## 2025-07-14 RX ORDER — ACETAMINOPHEN 500 MG/5ML
2 LIQUID (ML) ORAL
Qty: 40 | Refills: 0
Start: 2025-07-14 | End: 2025-08-12

## 2025-07-14 RX ORDER — ONDANSETRON HCL/PF 4 MG/2 ML
4 VIAL (ML) INJECTION EVERY 8 HOURS
Refills: 0 | Status: DISCONTINUED | OUTPATIENT
Start: 2025-07-14 | End: 2025-07-16

## 2025-07-14 RX ORDER — ACETAMINOPHEN 500 MG/5ML
650 LIQUID (ML) ORAL EVERY 6 HOURS
Refills: 0 | Status: DISCONTINUED | OUTPATIENT
Start: 2025-07-14 | End: 2025-07-14

## 2025-07-14 RX ADMIN — Medication 2 MILLIGRAM(S): at 17:56

## 2025-07-14 RX ADMIN — CEFTRIAXONE 100 MILLIGRAM(S): 500 INJECTION, POWDER, FOR SOLUTION INTRAMUSCULAR; INTRAVENOUS at 00:38

## 2025-07-14 RX ADMIN — TAMSULOSIN HYDROCHLORIDE 0.4 MILLIGRAM(S): 0.4 CAPSULE ORAL at 21:10

## 2025-07-14 RX ADMIN — TAMSULOSIN HYDROCHLORIDE 0.4 MILLIGRAM(S): 0.4 CAPSULE ORAL at 00:38

## 2025-07-14 RX ADMIN — SODIUM CHLORIDE 75 MILLILITER(S): 9 INJECTION, SOLUTION INTRAVENOUS at 18:06

## 2025-07-14 RX ADMIN — SODIUM CHLORIDE 120 MILLILITER(S): 9 INJECTION, SOLUTION INTRAVENOUS at 00:57

## 2025-07-14 RX ADMIN — Medication 2 MILLIGRAM(S): at 18:25

## 2025-07-14 NOTE — DISCHARGE NOTE PROVIDER - NSDCMRMEDTOKEN_GEN_ALL_CORE_FT
acetaminophen 325 mg oral tablet: 2 tab(s) orally every 6 hours as needed for  mild pain  tamsulosin 0.4 mg oral capsule: 1 cap(s) orally once a day (at bedtime)   acetaminophen 325 mg oral tablet: 2 tab(s) orally every 6 hours as needed for  mild pain  oxyCODONE 5 mg oral tablet: 1 tab(s) orally every 6 hours as needed for  severe pain MDD: 4 tabs  tamsulosin 0.4 mg oral capsule: 1 cap(s) orally once a day (at bedtime)   acetaminophen 325 mg oral tablet: 2 tab(s) orally every 6 hours as needed for  mild pain  Cipro 250 mg oral tablet: 1 tab(s) orally 2 times a day  ketorolac 10 mg oral tablet: 1 tab(s) orally every 6 hours as needed for  severe pain  tamsulosin 0.4 mg oral capsule: 1 cap(s) orally once a day (at bedtime)

## 2025-07-14 NOTE — PROGRESS NOTE ADULT - ASSESSMENT
50 y/o F w/ no significant PMHx presenting with left lower quadrant abdominal pain associated with hematuria, nausea and NBNB vomiting for 1 day. CT in ED shows a 4mm L ureteral stone, admitted for pain control and PO intolerance.     # 4mm ureteral stone  # Intractable nausea and vomiting  -  recs appreciated, conservtive management recmmended  - continue IVF, morphine PRN, zofran PRN  - continue flomax  - monitor PO intake  - DVT ppx: ambulate ad kristyn

## 2025-07-14 NOTE — DISCHARGE NOTE PROVIDER - NSFOLLOWUPCLINICS_GEN_ALL_ED_FT
[facility]  Urology  [address] [place] [address]  Phone: [phone]  Fax: [phone]

## 2025-07-14 NOTE — CONSULT NOTE ADULT - ASSESSMENT
49 year old female with 4mm left proximal ureteral calculi    discussed with Dr Rooney  recommend MET- IV fluids, flomax,  multimodal pain control  patient does not require urological intervention at this time would recommend conservative management  thank you for the courtesy of this consult  if pain remains intractable and patient unable to tolerate PO recommend medical admission.   patient understands plan and expressed agreement.  49 year old female with 4mm left proximal ureteral calculi    discussed with Dr Rooney  recommend MET- IV fluids, flomax,  multimodal pain control.   patient does not require urological intervention at this time would recommend conservative management  thank you for the courtesy of this consult  if pain remains intractable and patient unable to tolerate PO recommend medical admission.   patient understands plan and expressed agreement.

## 2025-07-14 NOTE — DISCHARGE NOTE NURSING/CASE MANAGEMENT/SOCIAL WORK - NSDCFUADDAPPT_GEN_ALL_CORE_FT
APPTS ARE READY TO BE MADE: [X] YES    Best Family or Patient Contact (if needed):    Additional Information about above appointments (if needed):    1: PCP  2: urology  3:     Other comments or requests:

## 2025-07-14 NOTE — CONSULT NOTE ADULT - CONSULT REQUESTED BY NAME
Cyrus [facility].     Results     XRAY LUMBAR @ [facility] (
Review of Systems   Constitutional: Negative.    HENT: Negative.     Eyes: Negative.    Respiratory: Negative.     Cardiovascular: Negative.    Gastrointestinal: Negative.    Genitourinary: Negative.    Musculoskeletal:  Positive for back pain, falls and myalgias.   Skin: Negative.    Neurological:  Positive for tingling and weakness.   Endo/Heme/Allergies: Negative.    Psychiatric/Behavioral: Negative.        
SCOTT Del Rosario

## 2025-07-14 NOTE — CONSULT NOTE ADULT - SUBJECTIVE AND OBJECTIVE BOX
· Patient is a 49-year-old female with no significant past medical history presenting  with left flank pain radiating to the groin as well as hematuria since earlier this morning.  Mandarin  used and relayed patient has had dull intermittent left flank pain radiating to the left groin since yesterday as well as hematuria. States she had IVF but no other PMHx. CT shows 4mm stone at proximal left ureter with associated mild hydronephrosis.  Denies any daily alcohol use, smoking, IV drug use.  Denies any fever, cough, congestion, dysuria, diarrhea.  Patient endorsing some nausea.  Intepreter [id number]  LMP 1 week ago.     PAST MEDICAL & SURGICAL HISTORY:  IVF    Vital Signs Last 24 Hrs  T(C): 36.7 (2025 23:57), Max: 37.1 (2025 18:34)  T(F): 98 (2025 23:57), Max: 98.7 (2025 18:34)  HR: 57 (2025 23:57) (55 - 57)  BP: 146/91 (2025 23:57) (125/83 - 146/91)  BP(mean): --  RR: 18 (2025 23:57) (16 - 18)  SpO2: 96% (2025 23:57) (96% - 99%)    Parameters below as of 2025 23:57  Patient On (Oxygen Delivery Method): room air        Constitutional: awake and alert.  HEENT: PERRLA, EOMI  Neck: Supple.  Respiratory: Breath sounds are clear bilaterally  Cardiovascular: S1 and S2, regular   Gastrointestinal: soft, nontender to abdomen, + left CVAT  Extremities:  no edema  Musculoskeletal: no joint swelling/tenderness, no abnormal movements  Skin: No rashes    LABS:                        12.6   10.23 )-----------( 219      ( 2025 19:30 )             37.1     2025 19:30    140    |  109    |  15     ----------------------------<  133    3.9     |  23     |  1.02     Ca    9.7        2025 19:30    TPro  7.9    /  Alb  4.4    /  TBili  0.7    /  DBili  x      /  AST  20     /  ALT  27     /  AlkPhos  73     2025 19:30    Urinalysis Basic - ( 2025 19:45 )    Color: Orange / Appearance: Turbid / S.027 / pH: x  Gluc: x / Ketone: x  / Bili: Small / Urobili: 1.0 mg/dL   Blood: x / Protein: 100 mg/dL / Nitrite: Negative   Leuk Esterase: Small / RBC: Too Numerous to count mostly crenated /HPF / WBC 5 /HPF   Sq Epi: x / Non Sq Epi: x / Bacteria: Few /HPF    Lactate, Blood: 1.4 mmol/L (25 @ 19:30) < from: CT Renal Stone Hunt (25 @ 21:57) >  IMPRESSION:    Mild left perinephric stranding and mild left hydronephrosis and   hydroureter secondary to a 4 mm stone at the proximal left ureter.      < end of copied text >

## 2025-07-14 NOTE — DISCHARGE NOTE PROVIDER - PROVIDER TOKENS
PROVIDER:[TOKEN:[[id number]],FOLLOWUP:[1 week]],PROVIDER:[TOKEN:[[id number]],FOLLOWUP:[1 week]]

## 2025-07-14 NOTE — DISCHARGE NOTE PROVIDER - HOSPITAL COURSE
48 y/o F w/ no significant PMHx presenting with left lower quadrant abdominal pain associated with hematuria, nausea and NBNB vomiting starting today morning around 11am. Pt initially went to urgent care for symptoms, however was recommended to go to the ED for concerning symptoms and further evaluation. Pt describes the pain as dull, initially rating it 7/10 along with chills and inability to keep anything down including water without vomiting. Now, pain is about 3/10 and able to intake only few sips of water. Pt denies any history of similar symptoms or hx of kidney stones. She attempted to take OTC Tylenol at home however unable to keep it down. Of note, pt drinks only about 2-3 glasses of water a day. Pt further denies fever, hx abdominal surgeries, hematochezia, dizziness, dysuria/urinary frequency, hematemesis, diarrhea/constipation.   Last BM: yesterday and a small BM sunday morning    In the ED, treated with IVF, antiemetics, pain control: received IVF 1000 bolus, zofran, acetaminophen, pepcid, ketorolac, and morphine one time dose with moderate temporary relief of pain, but still nauseous and vomiting, now with intermittent dull abdominal pain. On Ceftriaxone 1000 mg to cover for possible pyelonephritis.     Labs to r/o electrolyte abnml and pancreatitis- no leukocytosis, no DONTRELL noted, lipase wnl, HCG NEG  Lactate- WNL 1.4  UA to r/o UTI- NEG for UTI, but large amount blood   CT renal to r/o nephrolithiasis/colitis: shows a left-sided 4 mm stone  Urology consulted, recommend IV fluids, flomax, multimodal pain control.   patient does not require urological intervention at this time would recommend conservative management.   patient is medically optimized for discharge home. 50 y/o F w/ no significant PMHx presenting with left lower quadrant abdominal pain associated with hematuria, nausea and NBNB vomiting starting today morning around 11am. Pt initially went to urgent care for symptoms, however was recommended to go to the ED for concerning symptoms and further evaluation. Pt describes the pain as dull, initially rating it 7/10 along with chills and inability to keep anything down including water without vomiting. Now, pain is about 3/10 and able to intake only few sips of water. Pt denies any history of similar symptoms or hx of kidney stones. She attempted to take OTC Tylenol at home however unable to keep it down. Of note, pt drinks only about 2-3 glasses of water a day. Pt further denies fever, hx abdominal surgeries, hematochezia, dizziness, dysuria/urinary frequency, hematemesis, diarrhea/constipation.   Last BM: yesterday and a small BM sunday morning    In the ED, treated with IVF, antiemetics, pain control: received IVF 1000 bolus, zofran, acetaminophen, pepcid, ketorolac, and morphine one time dose with moderate temporary relief of pain, but still nauseous and vomiting, now with intermittent dull abdominal pain.     Labs to r/o electrolyte abnml and pancreatitis- no leukocytosis, no DONTRELL noted, lipase wnl, HCG NEG  Lactate- WNL 1.4  UA to r/o UTI- NEG for UTI, but large amount blood, low clinical suspicion of UTI given lack of fever, leukocytosis, monitored off abx.  CT renal to r/o nephrolithiasis/colitis: shows a left-sided 4 mm stone  Urology consulted, recommend IV fluids, flomax, multimodal pain control.   patient does not require urological intervention at this time would recommend conservative management.   patient is medically optimized for discharge home.    48 y/o F w/ no significant PMHx presenting with left lower quadrant abdominal pain associated with hematuria, nausea and NBNB vomiting. Pt initially went to urgent care for symptoms, however was recommended to go to the ED for concerning symptoms and further evaluation. Pt describes the pain as dull, initially rating it 7/10 along with chills and inability to keep anything down including water without vomiting. In the ED, treated with IVF, antiemetics, pain control: received IVF 1000 bolus, zofran, acetaminophen, pepcid, ketorolac, and morphine one time dose with moderate temporary relief of pain, but still nauseous and vomiting, now with intermittent dull abdominal pain. Labs to r/o electrolyte abnml and pancreatitis- no leukocytosis, no DONTRELL noted, lipase wnl, HCG NEG. Lactate- WNL 1.4. UA to r/o UTI- NEG for UTI, but large amount blood, low clinical suspicion of UTI given lack of fever, leukocytosis, monitored off abx. CT renal to r/o nephrolithiasis/colitis: shows a left-sided 4 mm stone, Urology consulted.  Urine culture growing gram positive cocci started ceftriaxone, resulted enterococcus faecalis sensitive to ampicillin transitioned.  Patient passed stone, no pain NVD.     Patient seen and examined at bedside, discussed with medical attending. Patient medically cleared for discharge to home.

## 2025-07-14 NOTE — DISCHARGE NOTE NURSING/CASE MANAGEMENT/SOCIAL WORK - FINANCIAL ASSISTANCE
[facility] provides services at a reduced cost to those who are determined to be eligible through [facility]’s financial assistance program. Information regarding [facility]’s financial assistance program can be found by going to [email or web address] or by calling [phone].

## 2025-07-14 NOTE — DISCHARGE NOTE PROVIDER - CARE PROVIDERS DIRECT ADDRESSES
[email or web address]

## 2025-07-14 NOTE — DISCHARGE NOTE NURSING/CASE MANAGEMENT/SOCIAL WORK - PATIENT PORTAL LINK FT
You can access the FollowMyHealth Patient Portal offered by [facility] by registering at the following website: [email or web address]. By joining [employer]’s FollowMyHealth portal, you will also be able to view your health information using other applications (apps) compatible with our system.

## 2025-07-14 NOTE — CONSULT NOTE ADULT - NS ATTEND AMEND GEN_ALL_CORE FT
49-year-old female with 4 mm ureteral stone causing pain not controlled with initial IV pain management.  She should be started on tamsulosin and ensure urine culture sent.  Labs and vital signs not indicating infectious process so no urgent stenting needed.  Would recommend IV pain management however if patient pain not controlled with IV pain medications can consider taking the patient to the OR for ureteral stent insertion

## 2025-07-14 NOTE — PATIENT PROFILE ADULT - FALL HARM RISK - UNIVERSAL INTERVENTIONS
Bed in lowest position, wheels locked, appropriate side rails in place/Call bell, personal items and telephone in reach/Instruct patient to call for assistance before getting out of bed or chair/Non-slip footwear when patient is out of bed/[place] to call system/Physically safe environment - no spills, clutter or unnecessary equipment/Purposeful Proactive Rounding/Room/bathroom lighting operational, light cord in reach

## 2025-07-14 NOTE — DISCHARGE NOTE PROVIDER - NSDCCPCAREPLAN_GEN_ALL_CORE_FT
PRINCIPAL DISCHARGE DIAGNOSIS  Diagnosis: Renal stone  Assessment and Plan of Treatment: you came into the hospital due to left sided abdominal pain with blood in urine, nausea and vomiting.   you were found with a 4 mm left proximal ureteral calculi   when you urinate, please use the strainer to catch any stones.   put the stone in the cup and give to the Urologist Dr. Rooney.   continue taking flomax 0.4 mg at bedtime  take tylenol 650 mg every 6 hours as needed for mild pain   follow up with Dr. Rooney, make an appointment   also follow up with your Primary Care Doctor.  Return back to the Emergency room if you have any worsening fevers, abdominal pain, difficulty urinating, nausea, vomiting.     PRINCIPAL DISCHARGE DIAGNOSIS  Diagnosis: Renal stone  Assessment and Plan of Treatment: you came into the hospital due to left sided abdominal pain with blood in urine, nausea and vomiting.   you were found with a 4 mm left proximal ureteral calculi   when you urinate, please use the strainer to catch any stones.   put the stone in the cup and give to the Urologist Dr. Rooney.   continue taking flomax 0.4 mg at bedtime  take tylenol 650 mg every 6 hours as needed for mild pain   follow up with Dr. Rooney, make an appointment   also follow up with your Primary Care Doctor.  Return back to the Emergency room if you have any worsening fevers, abdominal pain, difficulty urinating, nausea, vomiting.      SECONDARY DISCHARGE DIAGNOSES  Diagnosis: Acute UTI  Assessment and Plan of Treatment: You have a UTI and sensitive to antibiotics.  Please complete your antibiotics until finished.  Please follow up with your PCP and Urologist for further medical management.    Diagnosis: Hypokalemia  Assessment and Plan of Treatment: Your Potassium was low and was replaced.   Please follow up with your PCP in 1 week for repeat blood work and further medical management.

## 2025-07-14 NOTE — DISCHARGE NOTE PROVIDER - CARE PROVIDER_API CALL
Marcelo Rooney  Urology  [address] [place] [address]  Phone: [phone]  Fax: [phone]  Follow Up Time: 1 week    Haseeb Baird  Internal Medicine  [address] [place] [id number]  Phone: [phone]  Fax: [phone]  Follow Up Time: 1 week

## 2025-07-14 NOTE — DISCHARGE NOTE PROVIDER - NSDCFUADDAPPT_GEN_ALL_CORE_FT
APPTS ARE READY TO BE MADE: [X] YES    Best Family or Patient Contact (if needed):    Additional Information about above appointments (if needed):    1: PCP  2: urology  3:     Other comments or requests:    APPTS ARE READY TO BE MADE: [X] YES    Best Family or Patient Contact (if needed):    Additional Information about above appointments (if needed):    1: PCP  2: urology  3:     Other comments or requests:   Family member answered on behalf of the patient and advised they will pass forward our details for the patient to return our call.    APPTS ARE READY TO BE MADE: [X] YES    Best Family or Patient Contact (if needed):    Additional Information about above appointments (if needed):    1: PCP  2: urology  3:     Other comments or requests:   Patient was outreached but did not answer nor could a voicemail be left.

## 2025-07-15 DIAGNOSIS — Z75.8 OTHER PROBLEMS RELATED TO MEDICAL FACILITIES AND OTHER HEALTH CARE: ICD-10-CM

## 2025-07-15 LAB
ANION GAP SERPL CALC-SCNC: 6 MMOL/L — SIGNIFICANT CHANGE UP (ref 5–17)
ANISOCYTOSIS BLD QL: SLIGHT — SIGNIFICANT CHANGE UP
BUN SERPL-MCNC: 13 MG/DL — SIGNIFICANT CHANGE UP (ref 7–18)
CALCIUM SERPL-MCNC: 8.1 MG/DL — LOW (ref 8.4–10.5)
CHLORIDE SERPL-SCNC: 106 MMOL/L — SIGNIFICANT CHANGE UP (ref 96–108)
CO2 SERPL-SCNC: 25 MMOL/L — SIGNIFICANT CHANGE UP (ref 22–31)
CREAT SERPL-MCNC: 0.84 MG/DL — SIGNIFICANT CHANGE UP (ref 0.5–1.3)
EGFR: 85 ML/MIN/1.73M2 — SIGNIFICANT CHANGE UP
EGFR: 85 ML/MIN/1.73M2 — SIGNIFICANT CHANGE UP
GLUCOSE SERPL-MCNC: 109 MG/DL — HIGH (ref 70–99)
HCT VFR BLD CALC: 31.9 % — LOW (ref 34.5–45)
HGB BLD-MCNC: 10.9 G/DL — LOW (ref 11.5–15.5)
MANUAL SMEAR VERIFICATION: SIGNIFICANT CHANGE UP
MCHC RBC-ENTMCNC: 32.4 PG — SIGNIFICANT CHANGE UP (ref 27–34)
MCHC RBC-ENTMCNC: 34.2 G/DL — SIGNIFICANT CHANGE UP (ref 32–36)
MCV RBC AUTO: 94.9 FL — SIGNIFICANT CHANGE UP (ref 80–100)
MRSA PCR RESULT.: SIGNIFICANT CHANGE UP
NRBC BLD AUTO-RTO: 0 /100 WBCS — SIGNIFICANT CHANGE UP (ref 0–0)
OVALOCYTES BLD QL SMEAR: SLIGHT — SIGNIFICANT CHANGE UP
PLAT MORPH BLD: NORMAL — SIGNIFICANT CHANGE UP
PLATELET # BLD AUTO: 148 K/UL — LOW (ref 150–400)
PLATELET COUNT - ESTIMATE: ABNORMAL
POIKILOCYTOSIS BLD QL AUTO: SLIGHT — SIGNIFICANT CHANGE UP
POTASSIUM SERPL-MCNC: 3.1 MMOL/L — LOW (ref 3.5–5.3)
POTASSIUM SERPL-SCNC: 3.1 MMOL/L — LOW (ref 3.5–5.3)
RBC # BLD: 3.36 M/UL — LOW (ref 3.8–5.2)
RBC # FLD: 12.3 % — SIGNIFICANT CHANGE UP (ref 10.3–14.5)
RBC BLD AUTO: ABNORMAL
S AUREUS DNA NOSE QL NAA+PROBE: SIGNIFICANT CHANGE UP
SODIUM SERPL-SCNC: 137 MMOL/L — SIGNIFICANT CHANGE UP (ref 135–145)
WBC # BLD: 8.6 K/UL — SIGNIFICANT CHANGE UP (ref 3.8–10.5)
WBC # FLD AUTO: 8.6 K/UL — SIGNIFICANT CHANGE UP (ref 3.8–10.5)

## 2025-07-15 PROCEDURE — 99232 SBSQ HOSP IP/OBS MODERATE 35: CPT

## 2025-07-15 PROCEDURE — 80053 COMPREHEN METABOLIC PANEL: CPT

## 2025-07-15 PROCEDURE — 83605 ASSAY OF LACTIC ACID: CPT

## 2025-07-15 PROCEDURE — 87641 MR-STAPH DNA AMP PROBE: CPT

## 2025-07-15 PROCEDURE — 83690 ASSAY OF LIPASE: CPT

## 2025-07-15 PROCEDURE — 85025 COMPLETE CBC W/AUTO DIFF WBC: CPT

## 2025-07-15 PROCEDURE — 83735 ASSAY OF MAGNESIUM: CPT

## 2025-07-15 PROCEDURE — 84702 CHORIONIC GONADOTROPIN TEST: CPT

## 2025-07-15 PROCEDURE — 74176 CT ABD & PELVIS W/O CONTRAST: CPT

## 2025-07-15 PROCEDURE — 87640 STAPH A DNA AMP PROBE: CPT

## 2025-07-15 PROCEDURE — 80048 BASIC METABOLIC PNL TOTAL CA: CPT

## 2025-07-15 PROCEDURE — 87077 CULTURE AEROBIC IDENTIFY: CPT

## 2025-07-15 PROCEDURE — 81001 URINALYSIS AUTO W/SCOPE: CPT

## 2025-07-15 PROCEDURE — 84100 ASSAY OF PHOSPHORUS: CPT

## 2025-07-15 PROCEDURE — 36415 COLL VENOUS BLD VENIPUNCTURE: CPT

## 2025-07-15 PROCEDURE — 87086 URINE CULTURE/COLONY COUNT: CPT

## 2025-07-15 PROCEDURE — 85027 COMPLETE CBC AUTOMATED: CPT

## 2025-07-15 RX ORDER — ACETAMINOPHEN 500 MG/5ML
1000 LIQUID (ML) ORAL ONCE
Refills: 0 | Status: COMPLETED | OUTPATIENT
Start: 2025-07-15 | End: 2025-07-15

## 2025-07-15 RX ORDER — SODIUM CHLORIDE 9 G/1000ML
1000 INJECTION, SOLUTION INTRAVENOUS
Refills: 0 | Status: DISCONTINUED | OUTPATIENT
Start: 2025-07-15 | End: 2025-07-16

## 2025-07-15 RX ORDER — ACETAMINOPHEN 500 MG/5ML
1000 LIQUID (ML) ORAL EVERY 8 HOURS
Refills: 0 | Status: DISCONTINUED | OUTPATIENT
Start: 2025-07-15 | End: 2025-07-16

## 2025-07-15 RX ORDER — CEFTRIAXONE 500 MG/1
1000 INJECTION, POWDER, FOR SOLUTION INTRAMUSCULAR; INTRAVENOUS EVERY 24 HOURS
Refills: 0 | Status: DISCONTINUED | OUTPATIENT
Start: 2025-07-15 | End: 2025-07-16

## 2025-07-15 RX ORDER — KETOROLAC TROMETHAMINE 30 MG/ML
15 INJECTION, SOLUTION INTRAMUSCULAR; INTRAVENOUS EVERY 6 HOURS
Refills: 0 | Status: DISCONTINUED | OUTPATIENT
Start: 2025-07-15 | End: 2025-07-16

## 2025-07-15 RX ADMIN — Medication 400 MILLIGRAM(S): at 03:57

## 2025-07-15 RX ADMIN — SODIUM CHLORIDE 100 MILLILITER(S): 9 INJECTION, SOLUTION INTRAVENOUS at 17:55

## 2025-07-15 RX ADMIN — CEFTRIAXONE 100 MILLIGRAM(S): 500 INJECTION, POWDER, FOR SOLUTION INTRAMUSCULAR; INTRAVENOUS at 17:55

## 2025-07-15 RX ADMIN — Medication 2 MILLIGRAM(S): at 02:05

## 2025-07-15 RX ADMIN — TAMSULOSIN HYDROCHLORIDE 0.4 MILLIGRAM(S): 0.4 CAPSULE ORAL at 21:28

## 2025-07-15 RX ADMIN — Medication 1000 MILLILITER(S): at 09:57

## 2025-07-15 RX ADMIN — Medication 600 MILLIGRAM(S): at 02:33

## 2025-07-15 RX ADMIN — Medication 40 MILLIEQUIVALENT(S): at 21:28

## 2025-07-15 RX ADMIN — Medication 2 MILLIGRAM(S): at 02:28

## 2025-07-15 RX ADMIN — Medication 40 MILLIEQUIVALENT(S): at 16:00

## 2025-07-15 RX ADMIN — Medication 600 MILLIGRAM(S): at 03:43

## 2025-07-15 RX ADMIN — Medication 1000 MILLIGRAM(S): at 04:31

## 2025-07-15 NOTE — PROGRESS NOTE ADULT - ASSESSMENT
49 year old female with 4mm left proximal ureteral calculi.     Plan   - add toradol for pain control, continue w tylenol, oxy/morphine prn   - if pain remains uncontrolled by tonight, please order CT for am, and preop for possible stent   - cw MET - IV fluids, flomax, multimodal pain control  - encourage fluids, strain urine     Urology  49 year old female with 4mm left proximal ureteral calculi.     Plan   - add toradol for pain control, continue w tylenol, oxy/morphine prn   - cw MET - IV fluids, flomax, multimodal pain control  - encourage fluids, strain urine   - Would recommend CT abd/pelvis non con tonight to assess stone position    Urology

## 2025-07-15 NOTE — PROGRESS NOTE ADULT - ASSESSMENT
50 y/o F w/ no significant PMHx presenting with left lower quadrant abdominal pain associated with hematuria, nausea and NBNB vomiting. Pt initially went to urgent care for symptoms, however was recommended to go to the ED for concerning symptoms and further evaluation. Pt describes the pain as dull, initially rating it 7/10 along with chills and inability to keep anything down including water without vomiting. In the ED, treated with IVF, antiemetics, pain control: received IVF 1000 bolus, zofran, acetaminophen, pepcid, ketorolac, and morphine one time dose with moderate temporary relief of pain, but still nauseous and vomiting, now with intermittent dull abdominal pain. Labs to r/o electrolyte abnml and pancreatitis- no leukocytosis, no DONTRELL noted, lipase wnl, HCG NEG. Lactate- WNL 1.4. UA to r/o UTI- NEG for UTI, but large amount blood, low clinical suspicion of UTI given lack of fever, leukocytosis, monitored off abx. CT renal to r/o nephrolithiasis/colitis: shows a left-sided 4 mm stone, Urology consulted.

## 2025-07-15 NOTE — PROGRESS NOTE ADULT - NS ATTEND AMEND GEN_ALL_CORE FT
CC:  S:  O:    PE:  Gen: Oriented, alert, No acute distress Eyes: conjunctivae and lid normal, sclera clear with no icterus ENT: nose and throat exam normal CVS: S1, S2, RRR;  no murmur, no rubs, no gallops Pulm: Good air exchange, Breath sounds equal bilaterally, no rales rhonchi,  no wheezes Chest: nontender, no chest deformity, chest movement symmetrical Gl: abdomen soft, nontender, nondistended, bowel sounds normoactive, no masses palpated Musk: no msk pain, no joint swelling Skin: no skin lesions, skin turgor normal, warm and well perfused Neuro: Awake, alert      A/P:    Labs reviewed:     Imaging reviewed:     I independently reviewed:     History obtained from:    Discussed with (consultant), plan is...      Monitoring drug for toxicity for     Continue IV opioid?    Total Time Spent____ minutes  This includes chart review, patient assessment, discussion and collaboration with interdisciplinary team members. CC nausea, vomiting  S: Mandarin  [id number]  Patient had nausea, vomiting, LLQ pain  O:  Vital Signs Last 24 Hrs  T(C): 36.7 (07-15-25 @ 13:53), Max: 36.9 (07-14-25 @ 20:26)  T(F): 98.1 (07-15-25 @ 13:53), Max: 98.4 (07-14-25 @ 20:26)  HR: 74 (07-15-25 @ 13:53) (64 - 74)  BP: 103/66 (07-15-25 @ 13:53) (103/66 - 120/93)  BP(mean): 78 (07-15-25 @ 13:53) (78 - 100)  RR: 18 (07-15-25 @ 13:53) (18 - 19)  SpO2: 97% (07-15-25 @ 13:53) (96% - 98%)    PE:  Gen: Oriented, alert, No acute distress Eyes: conjunctivae and lid normal, sclera clear with no icterus ENT: nose and throat exam normal CVS: S1, S2, RRR; no murmur, no rubs, no gallops Pulm: Good air exchange, Breath sounds equal bilaterally, no rales rhonchi,  no wheezes Chest: nontender, no chest deformity, chest movement symmetrical Gl: abdomen soft, LLQ tender, nondistended, bowel sounds normoactive, no masses palpated Musk: no msk pain, no joint swelling Skin: no skin lesions, skin turgor normal, warm and well perfused Neuro: Awake, alert    A/P:  Caro Ramirez is a 48 y/o woman w/ no significant PMHx presenting with left lower quadrant abdominal pain associated with hematuria, nausea and NBNB vomiting for 1 day. CT in ED shows a 4mm L ureteral stone, admitted for pain control and PO intolerance.     # 4mm ureteral stone  # Intractable nausea and vomiting  #UTI  -  recs appreciated, conservtive management recommended  - given IVF, morphine PRN, zofran PRN  -Urine cx growing GPC in pairs and chains, follow up final urine cx , continue ceftriaxone for now.  - continue flomax  - monitor PO intake  - DVT ppx: ambulate ad kristyn  Labs reviewed:                         10.9   8.60  )-----------( 148      ( 15 Jul 2025 12:10 )             31.9   07-15    137  |  106  |  13  ----------------------------<  109[H]  3.1[L]   |  25  |  0.84  Ca    8.1[L]      15 Jul 2025 12:10  Phos  3.8     07-14  Mg     2.2     07-14  TPro  7.9  /  Alb  4.4  /  TBili  0.7  /  DBili  x   /  AST  20  /  ALT  27  /  AlkPhos  73  07-13  Imaging reviewed:   CT:  Mild left perinephric stranding and mild left hydronephrosis and   hydroureter secondary to a 4 mm stone at the proximal left ureter.

## 2025-07-16 VITALS
HEART RATE: 74 BPM | RESPIRATION RATE: 16 BRPM | TEMPERATURE: 99 F | OXYGEN SATURATION: 98 % | SYSTOLIC BLOOD PRESSURE: 120 MMHG | DIASTOLIC BLOOD PRESSURE: 71 MMHG

## 2025-07-16 DIAGNOSIS — N39.0 URINARY TRACT INFECTION, SITE NOT SPECIFIED: ICD-10-CM

## 2025-07-16 DIAGNOSIS — E87.6 HYPOKALEMIA: ICD-10-CM

## 2025-07-16 LAB
-  AMPICILLIN: SIGNIFICANT CHANGE UP
-  CIPROFLOXACIN: SIGNIFICANT CHANGE UP
-  LEVOFLOXACIN: SIGNIFICANT CHANGE UP
-  NITROFURANTOIN: SIGNIFICANT CHANGE UP
-  TETRACYCLINE: SIGNIFICANT CHANGE UP
-  VANCOMYCIN: SIGNIFICANT CHANGE UP
ANION GAP SERPL CALC-SCNC: 7 MMOL/L — SIGNIFICANT CHANGE UP (ref 5–17)
BUN SERPL-MCNC: 6 MG/DL — LOW (ref 7–18)
CALCIUM SERPL-MCNC: 8.4 MG/DL — SIGNIFICANT CHANGE UP (ref 8.4–10.5)
CHLORIDE SERPL-SCNC: 107 MMOL/L — SIGNIFICANT CHANGE UP (ref 96–108)
CO2 SERPL-SCNC: 24 MMOL/L — SIGNIFICANT CHANGE UP (ref 22–31)
CREAT SERPL-MCNC: 0.61 MG/DL — SIGNIFICANT CHANGE UP (ref 0.5–1.3)
CULTURE RESULTS: ABNORMAL
EGFR: 109 ML/MIN/1.73M2 — SIGNIFICANT CHANGE UP
EGFR: 109 ML/MIN/1.73M2 — SIGNIFICANT CHANGE UP
GLUCOSE SERPL-MCNC: 81 MG/DL — SIGNIFICANT CHANGE UP (ref 70–99)
HCT VFR BLD CALC: 31.4 % — LOW (ref 34.5–45)
HGB BLD-MCNC: 10.6 G/DL — LOW (ref 11.5–15.5)
MCHC RBC-ENTMCNC: 31.9 PG — SIGNIFICANT CHANGE UP (ref 27–34)
MCHC RBC-ENTMCNC: 33.8 G/DL — SIGNIFICANT CHANGE UP (ref 32–36)
MCV RBC AUTO: 94.6 FL — SIGNIFICANT CHANGE UP (ref 80–100)
METHOD TYPE: SIGNIFICANT CHANGE UP
NRBC BLD AUTO-RTO: 0 /100 WBCS — SIGNIFICANT CHANGE UP (ref 0–0)
ORGANISM # SPEC MICROSCOPIC CNT: ABNORMAL
ORGANISM # SPEC MICROSCOPIC CNT: ABNORMAL
PLATELET # BLD AUTO: 144 K/UL — LOW (ref 150–400)
POTASSIUM SERPL-MCNC: 3.4 MMOL/L — LOW (ref 3.5–5.3)
POTASSIUM SERPL-SCNC: 3.4 MMOL/L — LOW (ref 3.5–5.3)
RBC # BLD: 3.32 M/UL — LOW (ref 3.8–5.2)
RBC # FLD: 12.5 % — SIGNIFICANT CHANGE UP (ref 10.3–14.5)
SODIUM SERPL-SCNC: 138 MMOL/L — SIGNIFICANT CHANGE UP (ref 135–145)
SPECIMEN SOURCE: SIGNIFICANT CHANGE UP
WBC # BLD: 5.05 K/UL — SIGNIFICANT CHANGE UP (ref 3.8–10.5)
WBC # FLD AUTO: 5.05 K/UL — SIGNIFICANT CHANGE UP (ref 3.8–10.5)

## 2025-07-16 PROCEDURE — 87077 CULTURE AEROBIC IDENTIFY: CPT

## 2025-07-16 PROCEDURE — 96375 TX/PRO/DX INJ NEW DRUG ADDON: CPT

## 2025-07-16 PROCEDURE — 96376 TX/PRO/DX INJ SAME DRUG ADON: CPT

## 2025-07-16 PROCEDURE — 85025 COMPLETE CBC W/AUTO DIFF WBC: CPT

## 2025-07-16 PROCEDURE — 87641 MR-STAPH DNA AMP PROBE: CPT

## 2025-07-16 PROCEDURE — 96374 THER/PROPH/DIAG INJ IV PUSH: CPT

## 2025-07-16 PROCEDURE — 85027 COMPLETE CBC AUTOMATED: CPT

## 2025-07-16 PROCEDURE — 83605 ASSAY OF LACTIC ACID: CPT

## 2025-07-16 PROCEDURE — 81001 URINALYSIS AUTO W/SCOPE: CPT

## 2025-07-16 PROCEDURE — 83735 ASSAY OF MAGNESIUM: CPT

## 2025-07-16 PROCEDURE — 80053 COMPREHEN METABOLIC PANEL: CPT

## 2025-07-16 PROCEDURE — 74176 CT ABD & PELVIS W/O CONTRAST: CPT

## 2025-07-16 PROCEDURE — 99285 EMERGENCY DEPT VISIT HI MDM: CPT | Mod: 25

## 2025-07-16 PROCEDURE — 80048 BASIC METABOLIC PNL TOTAL CA: CPT

## 2025-07-16 PROCEDURE — 84100 ASSAY OF PHOSPHORUS: CPT

## 2025-07-16 PROCEDURE — 87640 STAPH A DNA AMP PROBE: CPT

## 2025-07-16 PROCEDURE — 36415 COLL VENOUS BLD VENIPUNCTURE: CPT

## 2025-07-16 PROCEDURE — 83690 ASSAY OF LIPASE: CPT

## 2025-07-16 PROCEDURE — 87086 URINE CULTURE/COLONY COUNT: CPT

## 2025-07-16 PROCEDURE — 99232 SBSQ HOSP IP/OBS MODERATE 35: CPT

## 2025-07-16 PROCEDURE — 84702 CHORIONIC GONADOTROPIN TEST: CPT

## 2025-07-16 PROCEDURE — 87186 SC STD MICRODIL/AGAR DIL: CPT

## 2025-07-16 PROCEDURE — 99239 HOSP IP/OBS DSCHRG MGMT >30: CPT

## 2025-07-16 RX ORDER — CIPROFLOXACIN HCL 250 MG
1 TABLET ORAL
Qty: 14 | Refills: 0
Start: 2025-07-16 | End: 2025-07-22

## 2025-07-16 RX ORDER — AMPICILLIN SODIUM 1 G/1
1000 INJECTION, POWDER, FOR SOLUTION INTRAMUSCULAR; INTRAVENOUS EVERY 6 HOURS
Refills: 0 | Status: DISCONTINUED | OUTPATIENT
Start: 2025-07-16 | End: 2025-07-16

## 2025-07-16 RX ORDER — KETOROLAC TROMETHAMINE 30 MG/ML
1 INJECTION, SOLUTION INTRAMUSCULAR; INTRAVENOUS
Qty: 20 | Refills: 0
Start: 2025-07-16 | End: 2025-07-20

## 2025-07-16 RX ADMIN — AMPICILLIN SODIUM 100 MILLIGRAM(S): 1 INJECTION, POWDER, FOR SOLUTION INTRAMUSCULAR; INTRAVENOUS at 18:49

## 2025-07-16 RX ADMIN — SODIUM CHLORIDE 100 MILLILITER(S): 9 INJECTION, SOLUTION INTRAVENOUS at 06:59

## 2025-07-16 RX ADMIN — Medication 40 MILLIEQUIVALENT(S): at 14:09

## 2025-07-16 RX ADMIN — AMPICILLIN SODIUM 100 MILLIGRAM(S): 1 INJECTION, POWDER, FOR SOLUTION INTRAMUSCULAR; INTRAVENOUS at 13:09

## 2025-07-16 NOTE — PROGRESS NOTE ADULT - SUBJECTIVE AND OBJECTIVE BOX
Interval   Had severe flank pain overnight after ambulating requiring 2 doses of morphine.     Subjective  Denies fevers, chills, nausea, vomiting, SOB, CP.  Tolerating diet.    Objective    Vital signs  T(F): , Max: 98.4 (07-14-25 @ 20:26)  HR: 64 (07-14-25 @ 20:26)  BP: 105/60 (07-14-25 @ 20:26)  SpO2: 96% (07-14-25 @ 20:26)  Wt(kg): --    Output       Gen: NAD  Abd: soft, nontender, nondistended  : wnl    Labs      07-14 @ 05:10    WBC 10.34 / Hct 34.4  / SCr 0.92     07-13 @ 19:30    WBC 10.23 / Hct 37.1  / SCr 1.02         Culture - Urine (collected 07-13-25 @ 19:45)  Source: Clean Catch Clean Catch (Midstream)  Preliminary Report (07-14-25 @ 21:40):    50,000 - 99,000 CFU/mL Gram Positive Cocci in Pairs and Chains    <10,000 CFU/ml Normal Urogenital catherine present        Urine Cx: ?  Blood Cx: ?    Imaging
NP Note discussed with  Primary Attending    Patient is a 49y old  Female who presents with a chief complaint of Intractable nausea, vomiting and LLQ abdominal pain in the setting of kidney stones (15 Jul 2025 08:04)      INTERVAL HPI/OVERNIGHT EVENTS: no new complaints    MEDICATIONS  (STANDING):  lactated ringers. 1000 milliLiter(s) (75 mL/Hr) IV Continuous <Continuous>  lactated ringers. 1000 milliLiter(s) (75 mL/Hr) IV Continuous <Continuous>  tamsulosin 0.4 milliGRAM(s) Oral at bedtime    MEDICATIONS  (PRN):  acetaminophen     Tablet .. 1000 milliGRAM(s) Oral every 8 hours PRN Mild Pain (1 - 3)  ketorolac   Injectable 15 milliGRAM(s) IV Push every 6 hours PRN Moderate Pain (4 - 6)  morphine  - Injectable 1 milliGRAM(s) IV Push every 4 hours PRN Severe Pain (7 - 10)  ondansetron Injectable 4 milliGRAM(s) IV Push every 8 hours PRN Nausea and/or Vomiting      __________________________________________________  REVIEW OF SYSTEMS:    CONSTITUTIONAL: No fever,   EYES: no acute visual disturbances  NECK: No pain or stiffness  RESPIRATORY: No cough; No shortness of breath  CARDIOVASCULAR: No chest pain, no palpitations  GASTROINTESTINAL: No pain. No nausea or vomiting; No diarrhea   NEUROLOGICAL: No headache or numbness, no tremors  MUSCULOSKELETAL: No joint pain, no muscle pain  GENITOURINARY: no dysuria, no frequency, no hesitancy  PSYCHIATRY: no depression , no anxiety  ALL OTHER  ROS negative        Vital Signs Last 24 Hrs  T(C): 36.9 (14 Jul 2025 20:26), Max: 36.9 (14 Jul 2025 20:26)  T(F): 98.4 (14 Jul 2025 20:26), Max: 98.4 (14 Jul 2025 20:26)  HR: 64 (14 Jul 2025 20:26) (64 - 65)  BP: 105/60 (14 Jul 2025 20:26) (105/60 - 120/93)  BP(mean): 100 (14 Jul 2025 14:37) (100 - 100)  RR: 19 (14 Jul 2025 20:26) (19 - 19)  SpO2: 96% (14 Jul 2025 20:26) (96% - 98%)    Parameters below as of 14 Jul 2025 20:26  Patient On (Oxygen Delivery Method): room air        ________________________________________________  PHYSICAL EXAM:  GENERAL: NAD  HEENT: Normocephalic;  conjunctivae and sclerae clear; moist mucous membranes;   NECK : supple  CHEST/LUNG: Clear to auscultation bilaterally with good air entry   HEART: S1 S2  regular; no murmurs, gallops or rubs  ABDOMEN: Soft, Nontender, Nondistended; Bowel sounds present  EXTREMITIES: no cyanosis; no edema; no calf tenderness  SKIN: warm and dry; no rash  NERVOUS SYSTEM:  Awake and alert; Oriented  to place, person and time ; no new deficits    _________________________________________________  LABS:                        11.6   10.34 )-----------( 178      ( 14 Jul 2025 05:10 )             34.4     07-14    139  |  108  |  17  ----------------------------<  123[H]  3.7   |  24  |  0.92    Ca    9.0      14 Jul 2025 05:10  Phos  3.8     07-14  Mg     2.2     07-14    TPro  7.9  /  Alb  4.4  /  TBili  0.7  /  DBili  x   /  AST  20  /  ALT  27  /  AlkPhos  73  07-13      Urinalysis Basic - ( 14 Jul 2025 05:10 )    Color: x / Appearance: x / SG: x / pH: x  Gluc: 123 mg/dL / Ketone: x  / Bili: x / Urobili: x   Blood: x / Protein: x / Nitrite: x   Leuk Esterase: x / RBC: x / WBC x   Sq Epi: x / Non Sq Epi: x / Bacteria: x      CAPILLARY BLOOD GLUCOSE            RADIOLOGY & ADDITIONAL TESTS:    Imaging  Reviewed:  YES/NO    Consultant(s) Notes Reviewed:   YES/ No      Plan of care was discussed with patient and /or primary care giver; all questions and concerns were addressed 
Subjective  No pain for >24 hours.   Denies fevers, chills, nausea, vomiting, SOB, CP.  Tolerating diet.    Objective    Vital signs  T(F): , Max: 98.6 (07-16-25 @ 05:20)  HR: 69 (07-16-25 @ 05:20)  BP: 115/68 (07-16-25 @ 05:20)  SpO2: 97% (07-16-25 @ 05:20)  Wt(kg): --    Output       Gen: NAD  Abd: soft, nontender, nondistended  : wnl    Labs      07-16 @ 06:24    WBC 5.05  / Hct 31.4  / SCr 0.61     07-15 @ 12:10    WBC 8.60  / Hct 31.9  / SCr 0.84         Culture - Urine (collected 07-13-25 @ 19:45)  Source: Clean Catch Clean Catch (Midstream)  Preliminary Report (07-15-25 @ 20:59):    50,000 - 99,000 CFU/mL Enterococcus faecalis    <10,000 CFU/ml Normal Urogenital catherine present        Urine Cx: ?  Blood Cx: ?    Imaging
[facility] Medicine  Patient is a 49y old  Female who presents with a chief complaint of intractable pain (14 Jul 2025 00:00)      SUBJECTIVE / OVERNIGHT EVENTS:  Patient seen at bedside, states she feels a little better, was able to keep water down this morning without vomiting.    MEDICATIONS  (STANDING):  lactated ringers. 1000 milliLiter(s) (75 mL/Hr) IV Continuous <Continuous>  tamsulosin 0.4 milliGRAM(s) Oral at bedtime    MEDICATIONS  (PRN):  acetaminophen     Tablet .. 650 milliGRAM(s) Oral every 6 hours PRN Temp greater or equal to 38C (100.4F), Mild Pain (1 - 3)  morphine  - Injectable 2 milliGRAM(s) IV Push every 4 hours PRN Severe Pain (7 - 10)  morphine  - Injectable 1 milliGRAM(s) IV Push every 4 hours PRN Moderate Pain (4 - 6)  ondansetron Injectable 4 milliGRAM(s) IV Push every 8 hours PRN Nausea and/or Vomiting          OBJECTIVE:  Vital Signs Last 24 Hrs  T(C): 36.6 (14 Jul 2025 09:36), Max: 37.1 (13 Jul 2025 18:34)  T(F): 97.8 (14 Jul 2025 09:36), Max: 98.7 (13 Jul 2025 18:34)  HR: 62 (14 Jul 2025 09:36) (55 - 62)  BP: 130/42 (14 Jul 2025 09:36) (115/66 - 147/86)  BP(mean): 65 (14 Jul 2025 09:36) (65 - 65)  RR: 18 (14 Jul 2025 09:36) (16 - 18)  SpO2: 99% (14 Jul 2025 09:36) (96% - 99%)    Parameters below as of 14 Jul 2025 09:36  Patient On (Oxygen Delivery Method): room air        PHYSICAL EXAM:  GENERAL: NAD  HEAD:  Atraumatic, Normocephalic  EYES: conjunctiva and sclera clear  NECK: Supple, No JVD  CHEST/LUNG: Clear to auscultation bilaterally; No wheeze  HEART: Regular rate and rhythm; No murmurs, rubs, or gallops  ABDOMEN: Soft, Nontender, Nondistended; Bowel sounds present  EXTREMITIES:  No clubbing, cyanosis, or edema  PSYCH: AAOx3  NEUROLOGY: non-focal  SKIN: No rashes or lesions      CAPILLARY BLOOD GLUCOSE        I&O's Summary            LABS:                        11.6   10.34 )-----------( 178      ( 14 Jul 2025 05:10 )             34.4     07-14    139  |  108  |  17  ----------------------------<  123[H]  3.7   |  24  |  0.92    Ca    9.0      14 Jul 2025 05:10  Phos  3.8     07-14  Mg     2.2     07-14    TPro  7.9  /  Alb  4.4  /  TBili  0.7  /  DBili  x   /  AST  20  /  ALT  27  /  AlkPhos  73  07-13          Urinalysis Basic - ( 14 Jul 2025 05:10 )    Color: x / Appearance: x / SG: x / pH: x  Gluc: 123 mg/dL / Ketone: x  / Bili: x / Urobili: x   Blood: x / Protein: x / Nitrite: x   Leuk Esterase: x / RBC: x / WBC x   Sq Epi: x / Non Sq Epi: x / Bacteria: x            RADIOLOGY & ADDITIONAL TESTS:      
NP Note discussed with  Primary Attending    Patient is a 49y old  Female who presents with a chief complaint of Intractable nausea, vomiting and LLQ abdominal pain in the setting of kidney stones (16 Jul 2025 10:01)      INTERVAL HPI/OVERNIGHT EVENTS: no new complaints    MEDICATIONS  (STANDING):  cefTRIAXone   IVPB 1000 milliGRAM(s) IV Intermittent every 24 hours  lactated ringers. 1000 milliLiter(s) (100 mL/Hr) IV Continuous <Continuous>  potassium chloride    Tablet ER 40 milliEquivalent(s) Oral once  tamsulosin 0.4 milliGRAM(s) Oral at bedtime    MEDICATIONS  (PRN):  acetaminophen     Tablet .. 1000 milliGRAM(s) Oral every 8 hours PRN Mild Pain (1 - 3)  ketorolac   Injectable 15 milliGRAM(s) IV Push every 6 hours PRN Moderate Pain (4 - 6)  morphine  - Injectable 1 milliGRAM(s) IV Push every 4 hours PRN Severe Pain (7 - 10)  ondansetron Injectable 4 milliGRAM(s) IV Push every 8 hours PRN Nausea and/or Vomiting      __________________________________________________  REVIEW OF SYSTEMS:    CONSTITUTIONAL: No fever,   EYES: no acute visual disturbances  NECK: No pain or stiffness  RESPIRATORY: No cough; No shortness of breath  CARDIOVASCULAR: No chest pain, no palpitations  GASTROINTESTINAL: No pain. No nausea or vomiting; No diarrhea   NEUROLOGICAL: No headache or numbness, no tremors  MUSCULOSKELETAL: No joint pain, no muscle pain  GENITOURINARY: no dysuria, no frequency, no hesitancy  PSYCHIATRY: no depression , no anxiety  ALL OTHER  ROS negative        Vital Signs Last 24 Hrs  T(C): 37 (16 Jul 2025 05:20), Max: 37 (16 Jul 2025 05:20)  T(F): 98.6 (16 Jul 2025 05:20), Max: 98.6 (16 Jul 2025 05:20)  HR: 69 (16 Jul 2025 05:20) (68 - 74)  BP: 115/68 (16 Jul 2025 05:20) (103/66 - 117/71)  BP(mean): 75 (16 Jul 2025 05:20) (75 - 78)  RR: 18 (16 Jul 2025 05:20) (18 - 18)  SpO2: 97% (16 Jul 2025 05:20) (97% - 97%)    Parameters below as of 16 Jul 2025 05:20  Patient On (Oxygen Delivery Method): room air        ________________________________________________  PHYSICAL EXAM:  GENERAL: NAD  HEENT: Normocephalic;  conjunctivae and sclerae clear; moist mucous membranes;   NECK : supple  CHEST/LUNG: Clear to auscultation bilaterally with good air entry   HEART: S1 S2  regular; no murmurs, gallops or rubs  ABDOMEN: Soft, Nontender, Nondistended; Bowel sounds present  EXTREMITIES: no cyanosis; no edema; no calf tenderness  SKIN: warm and dry; no rash  NERVOUS SYSTEM:  Awake and alert; Oriented  to place, person and time ; no new deficits    _________________________________________________  LABS:                        10.6   5.05  )-----------( 144      ( 16 Jul 2025 06:24 )             31.4     07-16    138  |  107  |  6[L]  ----------------------------<  81  3.4[L]   |  24  |  0.61    Ca    8.4      16 Jul 2025 06:24        Urinalysis Basic - ( 16 Jul 2025 06:24 )    Color: x / Appearance: x / SG: x / pH: x  Gluc: 81 mg/dL / Ketone: x  / Bili: x / Urobili: x   Blood: x / Protein: x / Nitrite: x   Leuk Esterase: x / RBC: x / WBC x   Sq Epi: x / Non Sq Epi: x / Bacteria: x      CAPILLARY BLOOD GLUCOSE            RADIOLOGY & ADDITIONAL TESTS:  < from: CT Renal Stone Hunt (07.13.25 @ 21:57) >  FINDINGS:  LOWER CHEST: Within normal limits.    LIVER: Within normal limits.  BILE DUCTS: Normal caliber.  GALLBLADDER: Within normal limits.  SPLEEN: Within normal limits.  PANCREAS: Within normal limits.  ADRENALS: Within normal limits.  KIDNEYS/URETERS: Punctate nonobstructing right renal upper pole   calcification. No hydronephrosis. Mild left perinephric stranding and   mild left hydronephrosis and hydroureter secondary to a 4 mm stone at the   proximal left ureter.    BLADDER: Within normal limits.  REPRODUCTIVE ORGANS: Uterus and adnexa within normal limits.    BOWEL: No bowel obstruction. Appendix is not visualized. No evidence of   inflammation in the pericecal region.  PERITONEUM/RETROPERITONEUM: Within normal limits.  VESSELS: Within normal limits.  LYMPH NODES: No lymphadenopathy.  ABDOMINAL WALL: 2.1 cm coarse calcification in the subcutaneous fat of   the right buttocks, possibly from prior trauma..  BONES: Within normal limits.    IMPRESSION:    Mild left perinephric stranding and mild left hydronephrosis and   hydroureter secondary to a 4 mm stone at the proximal left ureter.      --- End of Report ---    < end of copied text >    Imaging  Reviewed:  YES    Consultant(s) Notes Reviewed:   YES      Plan of care was discussed with patient and /or primary care giver; all questions and concerns were addressed

## 2025-07-16 NOTE — PROGRESS NOTE ADULT - ASSESSMENT
49 year old female with 4mm left proximal ureteral calculi.     Plan   - add toradol for pain control, continue w tylenol, oxy/morphine prn   - cw MET - IV fluids, flomax, multimodal pain control  - encourage fluids, strain urine   - NO CT NEEDED, if pt develops severe pain again, can reassess need for CT   - pt has likely passed stone, ok to dc from Urology perspective and fu w Dr. Rooney as an outpatient  - on dc, please cw flomax, straining urine, and 2-3 L fluid intake per day until fu   - cw abx treatment for UTI per primary     Urology

## 2025-07-16 NOTE — PROGRESS NOTE ADULT - ASSESSMENT
48 y/o F w/ no significant PMHx presenting with left lower quadrant abdominal pain associated with hematuria, nausea and NBNB vomiting. Pt initially went to urgent care for symptoms, however was recommended to go to the ED for concerning symptoms and further evaluation. Pt describes the pain as dull, initially rating it 7/10 along with chills and inability to keep anything down including water without vomiting. In the ED, treated with IVF, antiemetics, pain control: received IVF 1000 bolus, zofran, acetaminophen, pepcid, ketorolac, and morphine one time dose with moderate temporary relief of pain, but still nauseous and vomiting, now with intermittent dull abdominal pain. Labs to r/o electrolyte abnml and pancreatitis- no leukocytosis, no DONTRELL noted, lipase wnl, HCG NEG. Lactate- WNL 1.4. UA to r/o UTI- NEG for UTI, but large amount blood, low clinical suspicion of UTI given lack of fever, leukocytosis, monitored off abx. CT renal to r/o nephrolithiasis/colitis: shows a left-sided 4 mm stone, Urology consulted.  Urine culture growing gram positive cocci started ceftriaxone, resulted enterococcus faecalis sensitive to ampicillin transtioned.  Repeat CT abdomen and pelvis, patient wants repeat as not sure if stone passed.

## 2025-07-16 NOTE — PROGRESS NOTE ADULT - NS ATTEND AMEND GEN_ALL_CORE FT
CC nausea, vomiting  S: feeling anxious that the stone has not passed. Is agitated with urology giving different info (last night they said need CTAP) and today they said not needed. Discussed for a while that we are pending UCX she understands risk with radiation but feels better getting CTAP done while here. Will do non con. She has pain when ambulating but ok on rest.     O:  Vital Signs Last 24 Hrs  T(C): 36.7 (07-15-25 @ 13:53), Max: 36.9 (07-14-25 @ 20:26)  T(F): 98.1 (07-15-25 @ 13:53), Max: 98.4 (07-14-25 @ 20:26)  HR: 74 (07-15-25 @ 13:53) (64 - 74)  BP: 103/66 (07-15-25 @ 13:53) (103/66 - 120/93)  BP(mean): 78 (07-15-25 @ 13:53) (78 - 100)  RR: 18 (07-15-25 @ 13:53) (18 - 19)  SpO2: 97% (07-15-25 @ 13:53) (96% - 98%)    PE:  Gen: Oriented, alert, No acute distress Eyes: conjunctivae and lid normal, sclera clear with no icterus ENT: nose and throat exam normal CVS: S1, S2, RRR; no murmur, no rubs, no gallops Pulm: Good air exchange, Breath sounds equal bilaterally, no rales rhonchi,  no wheezes Chest: nontender, no chest deformity, chest movement symmetrical Gl: abdomen soft, LLQ tender, nondistended, bowel sounds normoactive, no masses palpated Musk: no msk pain, no joint swelling Skin: no skin lesions, skin turgor normal, warm and well perfused Neuro: Awake, alert    A/P:  Caro Ramirez is a 48 y/o woman w/ no significant PMHx presenting with left lower quadrant abdominal pain associated with hematuria, nausea and NBNB vomiting for 1 day. CT in ED shows a 4mm L ureteral stone, admitted for pain control and PO intolerance.     # 4mm ureteral stone  # Intractable nausea and vomiting  #UTI  -  recs appreciated, conservative management recommended  - given IVF, morphine PRN, zofran PRN  - Urine cx growing GPC in pairs and chains, follow up final urine cx , continue ceftriaxone for now still pending sensitivities   - will do CTAP non con discussed w/ patient she refuses to leave without it since urology yesterday told her we might need   - continue flomax  - monitor PO intake  - DVT ppx: ambulate ad kristyn  - if CT neg dc tomorrow on PO meds   Labs reviewed:                         10.6   5.05  )-----------( 144      ( 16 Jul 2025 06:24 )             31.4   138  |  107  |  6[L]  ----------------------------( 81     07-16 @ 06:24  3.4[L]   |  24  |  0.61    Ca: 8.4   Phos: x    Mg: x     TPro: x  / Alb: x  /  TBili x  / DBili x  /  AST x  /  ALT x  /  AlkPhos  x   Imaging reviewed:   CT:  Mild left perinephric stranding and mild left hydronephrosis and   hydroureter secondary to a 4 mm stone at the proximal left ureter.

## 2025-07-16 NOTE — PROGRESS NOTE ADULT - REASON FOR ADMISSION
Intractable nausea, vomiting and LLQ abdominal pain in the setting of kidney stones

## 2025-07-16 NOTE — PROGRESS NOTE ADULT - ATTENDING COMMENTS
40 yo female with 4 mm ureteral stone on tamsulosin. No pain at this time and it is likely stone has passed. If she is no longer taking pain medication can dc. If she has continued pain would repeat CT imaging to assess stone location.
40 yo female with 4-5 mm left proximal stone. Pain was controlled but pain returned and likely stone migrated. She has not required medications this afternoon for pain. Would recommend CT abd/pelvis to assess for stone positioning and continue tamsulosin for MET

## 2025-07-16 NOTE — PROGRESS NOTE ADULT - PROBLEM SELECTOR PLAN 1
overnight with pain  unable to drink adequate amounts of fluid  will give NS 1-2L bolus  will give Tylenol pain 1-3  will give Toradol 4-6  will give morphine 7-10  no blood in urine  strain urine for stone  f/u repeat CT   Urology consulted
overnight with pain  unable to drink adequate amounts of fluid  will give NS 1-2L bolus  will give Tylenol pain 1-3  will give Toradol 4-6  will give morphine 7-10  no blood in urine  strain urine for stone  if pain continue will repeat CT in am for possible procedure  Urology consulted

## 2025-07-16 NOTE — PROGRESS NOTE ADULT - PROBLEM SELECTOR PLAN 2
enterococcus faecalis UTI  sensitive to ampicillin  switch ceftriaxone to ampicillin
DVT PPX: SCD- ambulatory